# Patient Record
Sex: MALE | Race: WHITE
[De-identification: names, ages, dates, MRNs, and addresses within clinical notes are randomized per-mention and may not be internally consistent; named-entity substitution may affect disease eponyms.]

---

## 2018-02-11 NOTE — PDGENHP
History and Physical





- Chief Complaint


LEFT HIP PAIN





- History of Present Illness


Diagnosis:





1.   Left~Femoroacetabular impingement (AURA) Cam type, with~resultant labral 

tear~


2. ~~Right Hip arthroscopy May 2017


3. ~~Right hip joint space narrowing; ~20%





HISTORY OF PRESENT ILLNESS:


Lois a 22 y.o.~very ~active male~who I have had the pleasure to consult 

on today. I have enjoyed meeting him. He~lives in [place]. ~Lois a 

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 ~He~is single; he~has no~

children. ~Lois a professional triathelete.





Torsten's~left~hip pain~started in July 2017, with no~recalled trauma or injury

, and with no~previous complaints. Lodoes not have~a known history of hip 

dysplasia. 





On May 26, 2017 he had a Right Hip arthroscopy in [place], ~Dr. Rajinder Kim,

~which resolved 90% of his pain 





Presentation today is of anterior and posterior left~hip pain.~~The hip does not

~wake him~at night and does not~click and catch on him. Sitting does not 

present a problem~for him. Lodoes not~report suffering from lower back 

pain episodes. 





Lohas~participated in physical therapy (May until present)~and has not~

tried other conservative measures. He~has not~received sufficient symptomatic 

improvement.





Lohas not~utilized medication for pain management.





Lodenies issues with the right~hip. ~





Lounderstands that he~has a hip and pelvis problem which should be 

researched and wishes to get a better understanding of his~hip status, followed 

by an establishment of a treatment strategy, hoping heWesleywould be able to get 

back to his~well being active life.














History:








Past medical history: ~


None which is relevant 





Relevant familial history: None which is relevant 





Past surgical history: 














No. Surgery Anesthesia


 


1 Right Hip arthroscopy general


 


2 Liver cyst excision general

















Lodenies problematic issues with general anesthesia in the past.





I have reviewed, verified and agree with the past medical, surgical, family and 

social history.





Current Medications:~currently has no medications in their medication list.





ALLERGIES:~has No Known Allergies.














Objective:








Physical Examination:


Lois 5~feet 7~inches tall and weighs 145~Lbs. Torsten~is AAO x3; he~is 

well-nourished, in NAD. Skin is warm and dry. ~Breathing is non-labored. ~CV 

with RRR by pulse. Abdomen is soft, NTND. 


Currently, he~walks with a normal~gait.


Trendelenburg sign is negative~and proprioception is normal, both~sides.


He~presents with moderate~signs of joint laxity. Beightons Score: 8


Lower spine examination is negative~for sciatic or femoral nerve irritation 

with negative~SLR &~femoral stretch tests. Range of motion of the spine is 

normal~for flexion, extension, and rotations, with no~associated pain. 


Strength, Sensation and pulses are normal - bilaterally


Ankles and knees exams are normal~and no~mal-alignment is evident. 


He~has right~0.25~cm short leg length discrepancy.


Thigh circumference is asymmetric~with intermediate~muscle atrophy~on right~

side.





Hip ROM (degrees):











 FL ER


 At 90~hip FL IR


 At 90~hip FL AB AD EX IR


 Neutral hip ER


 Neutral hip


 


R 115 40 30 35 10 20 40 45


 


L 110 40 35 40 5 10 45 35








Specific hip and pelvis tests:











 Quadrant JAMAR Roll Add. Longus


 


R Post-surgical Post-surgical Negative Negative


 


L +++ +++ Negative Negative














 Glut. Med ITB Pos. Imp


 


R Negative


 5/5 strength Negative


 5/5 strength Negative


 


L Negative


 5/5 strength Negative


 5/5 strength Negative








Squeeze test measured normal


Bony Symphysis pubis is pain free~to touch while concentric activity of the 

rectus abdominis, does not~produce pain at its insertion.


Ilio Psos specific tests are negative for pain during cycling for both hips~and 

unremarkable for snap.


HF has right-sided weakness~


Greater trochanteric burse is pain free~on both hips.


Piriformis tests: FAIR is negative, with no~local signs of neuritis related to 

sciatic nerve.


SIJs examination is normal~with normal~JAMAR in relation and local tenderness.


Hamstrings tests are negative~functional contraction and negative~tendinopathy 

both hips.








Imaging:


Radiology studies which I have personally reviewed, analyzed and measured are 

below:





XR: 


AP of the hip and pelvis: 


Performed in a good~technique 


Coccyx to pubic symphysis distance 1~cm.


15~degrees 





[place] Lines are preserved.


Minimal~Pathological signs are seen in the Symphysis Pubis. 


Minimal~Pathological signs are seen at the Ischial tuberosity. ~





Specific measurements show: 














 NSA~ LCE Sourcil~Angle  Sharp's angle Lat.


 Cam Lat. Pincer C.Over~sign Head~Coverage % ATDmm


 


R N 34 3 38 - - - N N


 


L N 35 2 37 + - - N N




















 Pos. wall sign ISS NAD ~~Dysplasia Comments


 


R Negative Negative 9.8~mm Negative 


 


L Negative Negative 14~mm Negative 


 


 Sclerosis Sup. Lat. OA Cysts Joint Space-WBZ Joint Space-Medial


 


R Negative Negative Negative 3.3~mm 3.0~mm


 


L Negative Negative Negative 4.0~mm 3.8~mm











X Table lateral: 


Anterior cam lesion is seen~on the left hip.





Alpha Angle: ~


Right - Normal


Left 64~degrees





MRI was done but Torsten did not have images today.








Impression and plan: 





Torsten~is a 22 y.o.~active male~suffering from symptomatic left~hip pain due 

to Left~Femoroacetabular impingement (AURA) Cam type, with~resultant labral tear~

causing significant disability to him~and altering his~sport and life 

activities.


Physical examination, imaging, and his~story correspond with the diagnosis 

mentioned above.





I explained that femoroacetabular impingement (AURA) arises due to a bony or 

soft tissue conflict between the femur (ball) and acetabulum (socket) caused by 

an abnormality in the shape of the hip joint. Over time, repetitive impingement 

can result in damage to the labrum and adjacent surface cartilage within the 

socket, ultimately giving rise to progressive osteoarthritis of the hip.





I explained that although a labral tear can be a source of pain, it is rarely 

the root of the problem and typically occurs secondary to an underlying 

abnormality in the shape and mechanics of the hip joint. ~





I reviewed conservative treatment options for AURA including activity 

modification to avoid positions of impingement, physical therapy, non-steroidal 

anti-inflammatory medications, and various injections (corticosteroid and PRP) 

aimed at reducing inflammation in the hip joint or/and preventing dynamic 

impingement.~PRP injections may promote healing and reduce symptoms in certain 

cases but it will not repair chronically damaged tissue. Although these 

measures may help to buy time and reduce current level of symptoms, they are 

not a definitive solution to the problem given the underlying abnormality in 

the shape of the hip joint. 





With the right sided joint space narrowing, the addition of PRP may promote 

healing especially in the context of his high activity level.





Patients who have failed conservative management and continue to experience 

symptoms are candidates for hip arthroscopy, a minimally invasive surgery that 

can definitively address the underlying problem. Hip arthroscopy typically 

includes treating the labrum with either repair or reconstruction of the torn 

labrum; as well as addressing the underlying abnormalities by restoring the 

normal shape to the hip joint. ~If the cartilage is damaged a Microfracture 

surgical procedure may also be necessary to help stimulate the growth of 

fibrocartilage. ~If a patient requires a labral reconstruction or a 

Microfracture, the initial rehabilitation from the surgery may take longer, but 

the long term results are typically favorable.








I reviewed the technical aspects of hip arthroscopy including risks, benefits, 

and expected course of recovery. Torsten~understands that hip arthroscopy is a 

minimally invasive outpatient procedure carried out through small incisions on 

the outer aspect of the hip joint. During surgery, the labral tear will be 

identified and either repaired or reconstructed~using bone anchors and suture 

material. Additionally, any excessive bone will be removed with a high-speed 

juan daniel to reshape the hip joint and restore normal anatomy. Risks include 

infection, bleeding, injury to nearby nerves or vessels, stiffness, persistent 

pain, instability, venous thromboembolic disease, and traction related 

complications including temporary foot numbness. Rarely, revision surgery may 

be required to address these problems. Overall recovery takes approximately 4~ 

8~months depending on the extent of damage and degree of repair.


In the event that the labral tissue quality is inadequate for successful repair 

and healing, Torsten~understands that a labral reconstruction will be 

performed. This procedure entails placing a cadaver tissue graft within the hip 

joint and stabilizing it with bone anchors to build a new labrum. The overall 

recovery time for labral reconstruction is similar to that of labral repair, 

although the surgical procedure takes longer to perform.








Torsten~will review the info presented.





In order to obtain more detailed information regarding the alignment, 

orientation, and shape of the bony hip and pelvis I will order a CT scan to be 

performed. The results of the CT scan, including femoral torsion and acetabular 

version measured values and 3D images, will aid me in deciding on the best 

treatment strategy and surgical pre-planning.





Torsten will get us the MRI images, the surgical report from the Right Hip 

arthroscopy as well as the pictures from the surgery.





Torsten~will contact us if he~wishes to pursue further treatment in the future.





Torsten~is happy with this plan.





I have also supplied him~with handouts, outlining the expected surgical 

treatment and rehab involved.





I wish~Loall the best,











~~


Jim Negron, PAC








History Information





- Allergies/Home Medication List


Allergies/Adverse Reactions: 








No Known Allergies Allergy (Verified 01/11/18 14:17)


 





I have personally reviewed and updated: medical history





- Social History


Smoking Status: Never smoked





Review of Systems


Review of Systems: 








Physical Exam


Physical Exam:

## 2018-02-12 ENCOUNTER — HOSPITAL ENCOUNTER (OUTPATIENT)
Dept: HOSPITAL 80 - FSGY | Age: 23
Discharge: HOME | End: 2018-02-12
Attending: ORTHOPAEDIC SURGERY
Payer: COMMERCIAL

## 2018-02-12 VITALS — OXYGEN SATURATION: 96 % | DIASTOLIC BLOOD PRESSURE: 77 MMHG | SYSTOLIC BLOOD PRESSURE: 138 MMHG

## 2018-02-12 VITALS — TEMPERATURE: 98.1 F

## 2018-02-12 VITALS — RESPIRATION RATE: 12 BRPM

## 2018-02-12 VITALS — HEART RATE: 54 BPM

## 2018-02-12 DIAGNOSIS — M25.852: Primary | ICD-10-CM

## 2018-02-12 PROCEDURE — C1713 ANCHOR/SCREW BN/BN,TIS/BN: HCPCS

## 2018-02-12 PROCEDURE — 0SQB4ZZ REPAIR LEFT HIP JOINT, PERCUTANEOUS ENDOSCOPIC APPROACH: ICD-10-PCS | Performed by: ORTHOPAEDIC SURGERY

## 2018-02-12 RX ADMIN — BUPIVACAINE HYDROCHLORIDE AND EPINEPHRINE ONE: 5; 5 INJECTION, SOLUTION EPIDURAL; INTRACAUDAL; PERINEURAL at 14:43

## 2018-02-12 RX ADMIN — BUPIVACAINE HYDROCHLORIDE AND EPINEPHRINE ONE ML: 5; 5 INJECTION, SOLUTION EPIDURAL; INTRACAUDAL; PERINEURAL at 14:35

## 2018-02-12 NOTE — PDANEPAE
ANE History of Present Illness





left hip pain





ANE Past Medical History





- Cardiovascular History


Hx Hypertension: No


Hx Arrhythmias: No


Hx Chest Pain: No


Hx Coronary Artery / Peripheral Vascular Disease: No


Hx CHF / Valvular Disease: No


Hx Palpitations: No





- Pulmonary History


Hx COPD: No


Hx Asthma/Reactive Airway Disease: No


Hx Recent Upper Respiratory Infection: No


Hx Oxygen in Use at Home: No


Hx Sleep Apnea: No


Sleep Apnea Screening Result - Last Documented: Negative





- Neurologic History


Hx Cerebrovascular Accident: No


Hx Seizures: No


Hx Dementia: No





- Endocrine History


Hx Diabetes: No





- Renal History


Hx Renal Disorders: No





- Liver History


Hx Hepatic Disorders: No


Hepatic History Comment: cyst on liver rupture/drained





- Neurological & Psychiatric Hx


Hx Neurological and Psychiatric Disorders: No





- Cancer History


Hx Cancer: No





- Congenital Disorder History


Hx Congenital Disorders: No





- GI History


Hx Gastrointestinal Disorders: No





- Other Health History


Other Health History: none





- Chronic Pain History


Chronic Pain: No





- Surgical History


Prior Surgeries: R hip scope/repair





ANE Review of Systems


Review of Systems: 








- Exercise capacity


METS (RN): 6 METS





ANE Patient History





- Allergies


Allergies/Adverse Reactions: 








No Known Allergies Allergy (Verified 01/11/18 14:17)


 








- NPO status


NPO Since - Liquids (Date): 02/11/18


NPO Since - Liquids (Time): 20:00


NPO Since - Solids (Date): 02/11/18


NPO Since - Solids (Time): 20:00





- Smoking Hx


Smoking Status: Never smoked





- Family Anes Hx


Family Hx Anesthesia Complications: none





ANE Labs/Vital Signs





- Vital Signs


Blood Pressure: 117/72


Heart Rate: 54


Respiratory Rate: 14


O2 Sat (%): 99


Height: 170.18 cm


Weight: 63.503 kg





ANE Physical Exam





- Airway


Neck exam: FROM


Mallampati Score: Class 1


Mouth exam: normal dental/mouth exam





- Pulmonary


Pulmonary: no respiratory distress





- Cardiovascular


Cardiovascular: regular rate and rhythym





- ASA Status


ASA Status: I





ANE Anesthesia Plan


Anesthesia Plan: general endotracheal anesthesia

## 2018-02-12 NOTE — PDANEPAE
ANE History of Present Illness


22 year old male with AURA presents for left hip arthroscopy and femoroplasty.





ANE Past Medical History





- Cardiovascular History


Hx Hypertension: No


Hx Arrhythmias: No


Hx Chest Pain: No


Hx Coronary Artery / Peripheral Vascular Disease: No


Hx CHF / Valvular Disease: No


Hx Palpitations: No





- Pulmonary History


Hx COPD: No


Hx Asthma/Reactive Airway Disease: No


Hx Recent Upper Respiratory Infection: No


Hx Oxygen in Use at Home: No


Hx Sleep Apnea: No


Sleep Apnea Screening Result - Last Documented: Negative





- Neurologic History


Hx Cerebrovascular Accident: No


Hx Seizures: No


Hx Dementia: No





- Endocrine History


Hx Diabetes: No


Hypothyroid: No


Hyperthyroid: No


Obesity: no





- Renal History


Hx Renal Disorders: No





- Liver History


Hx Hepatic Disorders: No


Hepatic History Comment: cyst on liver rupture/drained





- Neurological & Psychiatric Hx


Hx Neurological and Psychiatric Disorders: No





- Cancer History


Hx Cancer: No





- Congenital Disorder History


Hx Congenital Disorders: No





- GI History


Hx Gastrointestinal Disorders: No





- Other Health History


Other Health History: none





- Chronic Pain History


Chronic Pain: No





- Surgical History


Prior Surgeries: R hip scope/repair





ANE Review of Systems


Review of systems is: negative


Review of Systems: 








- Exercise capacity


Exercise capacity: >=4 METS


METS (RN): 6 METS





ANE Patient History





- Allergies


Allergies/Adverse Reactions: 








No Known Allergies Allergy (Verified 01/11/18 14:17)


 








- NPO status


NPO Status: no food or drink >8 hours


NPO Since - Liquids (Date): 02/11/18


NPO Since - Liquids (Time): 20:00


NPO Since - Solids (Date): 02/11/18


NPO Since - Solids (Time): 20:00





- Anes Hx


Anes Hx: no prior problems





- Smoking Hx


Smoking Status: Never smoked


Marijuana use: No





- Alcohol Use


Alcohol Use: None





- Family Anes Hx


Family Anes Hx: none


Family Hx Anesthesia Complications: none





ANE Labs/Vital Signs





- Vital Signs


Vital Signs: reviewed preoperatively; see RN documention for details


Blood Pressure: 117/72


Heart Rate: 54


Respiratory Rate: 14


O2 Sat (%): 99


Height: 170.18 cm


Weight: 63.503 kg





ANE Physical Exam





- Airway


Neck exam: FROM


Mallampati Score: Class 1


Mouth exam: normal dental/mouth exam





- Pulmonary


Pulmonary: no respiratory distress





- Cardiovascular


Cardiovascular: regular rate and rhythym





- ASA Status


ASA Status: I





ANE Anesthesia Plan


Anesthesia Plan: general endotracheal anesthesia (Due to neuromonitoring, will 

be near TIVA, but not complete TIVA.)


Total IV Anesthesia: No